# Patient Record
Sex: FEMALE | Race: OTHER
[De-identification: names, ages, dates, MRNs, and addresses within clinical notes are randomized per-mention and may not be internally consistent; named-entity substitution may affect disease eponyms.]

---

## 2019-03-26 PROBLEM — Z00.00 ENCOUNTER FOR PREVENTIVE HEALTH EXAMINATION: Status: ACTIVE | Noted: 2019-03-26

## 2019-03-30 ENCOUNTER — APPOINTMENT (OUTPATIENT)
Dept: MATERNAL FETAL MEDICINE | Facility: CLINIC | Age: 29
End: 2019-03-30
Payer: COMMERCIAL

## 2019-03-30 ENCOUNTER — APPOINTMENT (OUTPATIENT)
Dept: ANTEPARTUM | Facility: CLINIC | Age: 29
End: 2019-03-30
Payer: COMMERCIAL

## 2019-03-30 ENCOUNTER — ASOB RESULT (OUTPATIENT)
Age: 29
End: 2019-03-30

## 2019-03-30 VITALS
SYSTOLIC BLOOD PRESSURE: 110 MMHG | DIASTOLIC BLOOD PRESSURE: 64 MMHG | HEIGHT: 67 IN | BODY MASS INDEX: 27.15 KG/M2 | WEIGHT: 173 LBS

## 2019-03-30 DIAGNOSIS — N96 RECURRENT PREGNANCY LOSS: ICD-10-CM

## 2019-03-30 DIAGNOSIS — Z78.9 OTHER SPECIFIED HEALTH STATUS: ICD-10-CM

## 2019-03-30 DIAGNOSIS — R79.89 OTHER SPECIFIED ABNORMAL FINDINGS OF BLOOD CHEMISTRY: ICD-10-CM

## 2019-03-30 PROCEDURE — 76817 TRANSVAGINAL US OBSTETRIC: CPT

## 2019-03-30 PROCEDURE — 99244 OFF/OP CNSLTJ NEW/EST MOD 40: CPT

## 2019-03-30 RX ORDER — MULTIVIT-MIN/FOLIC/VIT K/LYCOP 400-300MCG
28-0.8 TABLET ORAL
Qty: 30 | Refills: 0 | Status: ACTIVE | COMMUNITY
Start: 2019-01-07

## 2019-03-30 RX ORDER — ASPIRIN 81 MG/1
81 TABLET, CHEWABLE ORAL DAILY
Refills: 0 | Status: ACTIVE | COMMUNITY
Start: 2019-03-30

## 2019-03-30 NOTE — DISCUSSION/SUMMARY
[FreeTextEntry1] : Vicki is a 28-year-old  being seen today at approximately 7 weeks for history of recurrent miscarriage. The patient's obstetrical history is significant for 2 miscarriages one at 9 weeks and one at 7 weeks. Neither of these required surgical intervention.\par \par A first trimester ultrasound was performed today which does reveal a single viable intrauterine gestation with size consistent with dates. A 9 x 3 mm subchorionic hematoma is seen near the cervix. Vital signs today reveal a blood pressure of 110/64 and maternal weight is 173 pounds which is consistent with a BMI of 27.10KG.\par \par Patient has had a thrombophilia panel performed in her doctor's office which she states was negative. If those results could be forwarded to me it would be appreciated. In addition the patient had a progesterone level drawn on  which was low at 5.2 ng/mL. The etiology of recurrent miscarriage was discussed. 70% of the time they are related to chromosomal aneuploidy. Minor problems associated with recurrent miscarriage include luteal phase defect and uterine anatomical defects. With a progesterone level of 5.2 it certainly could be the etiology for her pregnancy losses. We will start the patient on 200 mg progesterone suppositories PV at bedtime until fetal heart tones are heard using external daptone  at 12-14 Weeks. In Addition the Vicki is taking Baby Aspirin 81 Mg which she should discontinue at 14 Weeks. The patient is to refrain from intercourse until approximately 48 hours after the bleeding stops. She was advised to have an ultrasound in your office in 2 weeks and followup in 5 weeks for ultrascreen evaluation. All of the above was discussed with the patient and her  and all their questions were answered.\par \par The patient's family, medical and surgical histories are noncontributory. She has no known allergies to medications and denies alcohol, tobacco or drug use.\par \par Recommendations;\par \par #1. Followup Ultrasound in the Office in 2 Weeks.\par #2. Ultrascreen Evaluation in 5 Weeks Is Recommended.\par #3. Continue Baby Aspirin 81 Mg until  14 Weeks.\par #4. Progesterone Suppositories 200 Mg per Vagina until Fetal Heart Tones Are heard Externally.\par #5. Follow Maternal Fetal Medicine Consultation As Clinically Indicated.

## 2019-04-01 ENCOUNTER — OTHER (OUTPATIENT)
Age: 29
End: 2019-04-01

## 2019-04-01 RX ORDER — PROGESTERONE 90 MG/1.125G
8 GEL VAGINAL DAILY
Qty: 30 | Refills: 2 | Status: ACTIVE | COMMUNITY
Start: 2019-04-01 | End: 1900-01-01

## 2019-04-30 ENCOUNTER — ASOB RESULT (OUTPATIENT)
Age: 29
End: 2019-04-30

## 2019-04-30 ENCOUNTER — APPOINTMENT (OUTPATIENT)
Age: 29
End: 2019-04-30
Payer: COMMERCIAL

## 2019-04-30 PROCEDURE — 76813 OB US NUCHAL MEAS 1 GEST: CPT

## 2019-04-30 PROCEDURE — 36416 COLLJ CAPILLARY BLOOD SPEC: CPT

## 2019-05-06 LAB
1ST TRIMESTER DATA: NORMAL
ADDENDUM DOC: NORMAL
AFP PNL SERPL: NORMAL
AFP SERPL-ACNC: NORMAL
CLINICAL BIOCHEMIST REVIEW: NORMAL
FREE BETA HCG 1ST TRIMESTER: NORMAL
Lab: NORMAL
NASAL BONE: PRESENT
NOTES NTD: NORMAL
NT: NORMAL
PAPP-A SERPL-ACNC: NORMAL
TRISOMY 18/3: NORMAL

## 2019-06-24 ENCOUNTER — APPOINTMENT (OUTPATIENT)
Dept: ANTEPARTUM | Facility: CLINIC | Age: 29
End: 2019-06-24